# Patient Record
Sex: FEMALE | Race: BLACK OR AFRICAN AMERICAN | NOT HISPANIC OR LATINO | Employment: STUDENT | URBAN - NONMETROPOLITAN AREA
[De-identification: names, ages, dates, MRNs, and addresses within clinical notes are randomized per-mention and may not be internally consistent; named-entity substitution may affect disease eponyms.]

---

## 2024-06-19 NOTE — PROGRESS NOTES
[facility] - PODIATRY    Today's Date: 06/26/2024     Patient Name: Sharita Mishra  MRN: [id number]  [facility]: [id number]  PCP: Ekta Ash MD  Referring Provider: Ekta Ash MD    SUBJECTIVE     Chief Complaint   Patient presents with    [facility]     Ekta Ash MD 03/25/2024 Cellulitis of toe of left foot-pt states she is here today for ingrown toe nail left great toe that she has had for 5 months-pt reports pain level 8/10     HPI: Sharita Mishra, a 19 y.o.female, comes to clinic as a(n) new patient complaining of ingrown toenail.  No significant medical history . Patient reports she has had issues with an ingrown nail on her left hallux for over 5 months.  She reports she has had a round of oral antibiotics from her PCP, with no improvement.  Patient reports she has had drainage, erythema, and swelling to the lateral border of her left hallux nail. Patient is non-diabetic. Admits pain at 8/10 level, described as aching, and centered around left hallux nail . Relates previous treatment(s) including oral antibiotics . Denies any constitutional symptoms. No other pedal complaints at this time.    Past Medical History:   Diagnosis Date    Ingrown nail of great toe      History reviewed. No pertinent surgical history.  History reviewed. No pertinent family history.  Social History     Socioeconomic History    Marital status: Single   Tobacco Use    Smoking status: Never     Passive exposure: Never    Smokeless tobacco: Never   Vaping Use    Vaping status: Never Used   Substance and Sexual Activity    Alcohol use: Never    Drug use: Defer    Sexual activity: Defer     No Known Allergies  Current Outpatient Medications   Medication Sig Dispense Refill    Tri-Estarylla 0.18/0.215/0.25 MG-35 MCG per tablet Take 1 tablet by mouth Daily.       No current facility-administered medications for this visit.     Review of Systems   Constitutional:  Negative for chills and fever.   HENT:  Negative  for congestion.    Respiratory:  Negative for shortness of breath.    Cardiovascular:  Negative for chest pain and leg swelling.   Gastrointestinal:  Negative for constipation, diarrhea, nausea and vomiting.   Musculoskeletal: Negative.    Skin:  Negative for wound.   Neurological:  Negative for numbness.     OBJECTIVE     Vitals:    06/26/24 0912   BP: 110/72   Pulse: 101   SpO2: 96%       PHYSICAL EXAM  GEN:   Accompanied by friend.     Foot/Ankle Exam    GENERAL  Appearance:  appears stated age  Orientation:  AAOx3  Affect:  appropriate  Gait:  unimpaired  Assistance:  independent  Right shoe gear: sandal  Left shoe gear: sandal    VASCULAR     Right Foot Vascularity   Dorsalis pedis:  2+  Posterior tibial:  2+  Skin temperature:  warm  Edema grading:  None  CFT:  3  Pedal hair growth:  Present  Varicosities:  none     Left Foot Vascularity   Dorsalis pedis:  2+  Posterior tibial:  2+  Skin temperature:  warm  Edema grading:  None  CFT:  3  Pedal hair growth:  Present  Varicosities:  none     NEUROLOGIC     Right Foot Neurologic   Normal sensation    Light touch sensation: normal  Vibratory sensation: normal  Hot/Cold sensation: normal  Protective Sensation using [place]-Rad[place] Monofilament:   Sites intact: 10  Sites tested: 10     Left Foot Neurologic   Normal sensation    Light touch sensation: normal  Vibratory sensation: normal  Hot/Cold sensation:  normal  Protective Sensation using [place]-Radha Monofilament:   Sites intact: 10  Sites tested: 10    MUSCULOSKELETAL     Right Foot Musculoskeletal   Tenderness:  none    Arch:  Normal     Left Foot Musculoskeletal   Tenderness:  toe 1 tenderness  Arch:  Normal    MUSCLE STRENGTH     Right Foot Muscle Strength   Foot dorsiflexion:  5  Foot plantar flexion:  5  Foot inversion:  5  Foot eversion:  5     Left Foot Muscle Strength   Foot dorsiflexion:  5  Foot plantar flexion:  5  Foot inversion:  5  Foot eversion:  5    RANGE OF MOTION     Right Foot Range of  Motion   Foot and ankle ROM within normal limits       Left Foot Range of Motion   Foot and ankle ROM within normal limits      DERMATOLOGIC      Right Foot Dermatologic   Skin  Right foot skin is intact.      Left Foot Dermatologic   Skin  Left foot skin is intact.   Nails  1.  Positive for ingrown toenail and paronychia.    RADIOLOGY/NUCLEAR:  No results found.    LABORATORY/CULTURE RESULTS:      PATHOLOGY RESULTS:       ASSESSMENT/PLAN     Diagnoses and all orders for this visit:    1. Ingrown nail of great toe of left foot (Primary)  -     lidocaine (XYLOCAINE) 2% injection 5 mL    2. Paronychia of great toe of left foot      Comprehensive lower extremity examination and evaluation was performed.  Discussed findings and treatment plan including risks, benefits, and treatment options with patient in detail. Patient agreed with treatment plan.  Discussed options with patient of slant back vs partial nail avulsion.  Patient opts for PNA with matrixectomy today.   After written consent obtained, total/partial nail avulsion(s) performed as documented in procedure note. Post-procedure instructions given.    An After Visit Summary was printed and given to the patient at discharge, including (if requested) any available informative/educational handouts regarding diagnosis, treatment, or medications. All questions were answered to patient/family satisfaction. Should symptoms fail to improve or worsen they agree to call or return to clinic or to go to the Emergency Department. Discussed the importance of following up with any needed screening tests/labs/specialist appointments and any requested follow-up recommended by me today. Importance of maintaining follow-up discussed and patient accepts that missed appointments can delay diagnosis and potentially lead to worsening of conditions.  Return in about 2 weeks (around 7/10/2024) for Follow-up with Podiatry APRN, Schedule [facility]., or sooner if acute issues  arise.    Nail Removal    Date/Time: 6/26/2024 9:30 AM    Performed by: Juliette Whiting APRN  Authorized by: Juliette Whiting APRN  Location: left foot  Location details: left big toe  Anesthesia: digital block    Anesthesia:  Local Anesthetic: lidocaine 2% without epinephrine  Anesthetic total: 5 mL    Sedation:  Patient sedated: no    Preparation: skin prepped with providone-iodine  Amount removed: partial  Side: lateral  Wedge excision of skin of nail fold: no  Nail bed sutured: no  Nail matrix removed: partial  Removed nail replaced and anchored: no  Dressing: gauze roll  Patient tolerance: patient tolerated the procedure well with no immediate complications  Comments: Flushed with alcohol.  Silvadene applied. Pressure dressing applied.            This document has been electronically signed by OPAL Calixto on June 26, 2024 12:59 CDT

## 2024-06-25 ENCOUNTER — TELEPHONE (OUTPATIENT)
Age: 19
End: 2024-06-25
Payer: COMMERCIAL

## 2024-06-26 ENCOUNTER — OFFICE VISIT (OUTPATIENT)
Age: 19
End: 2024-06-26
Payer: COMMERCIAL

## 2024-06-26 VITALS
HEIGHT: 63 IN | SYSTOLIC BLOOD PRESSURE: 110 MMHG | HEART RATE: 101 BPM | WEIGHT: 150 LBS | DIASTOLIC BLOOD PRESSURE: 72 MMHG | OXYGEN SATURATION: 96 % | BODY MASS INDEX: 26.58 KG/M2

## 2024-06-26 DIAGNOSIS — L03.032 PARONYCHIA OF GREAT TOE OF LEFT FOOT: ICD-10-CM

## 2024-06-26 DIAGNOSIS — L60.0 INGROWN NAIL OF GREAT TOE OF LEFT FOOT: Primary | ICD-10-CM

## 2024-06-26 PROCEDURE — 11750 EXCISION NAIL&NAIL MATRIX: CPT | Performed by: NURSE PRACTITIONER

## 2024-06-26 PROCEDURE — 99203 OFFICE O/P NEW LOW 30 MIN: CPT | Performed by: NURSE PRACTITIONER

## 2024-06-26 RX ORDER — NORGESTIMATE AND ETHINYL ESTRADIOL 7DAYSX3 28
1 KIT ORAL DAILY
COMMUNITY
Start: 2024-04-15

## 2024-06-26 RX ORDER — LIDOCAINE HYDROCHLORIDE 20 MG/ML
5 INJECTION, SOLUTION INFILTRATION; PERINEURAL ONCE
Status: COMPLETED | OUTPATIENT
Start: 2024-06-26 | End: 2024-06-26

## 2024-06-26 RX ADMIN — LIDOCAINE HYDROCHLORIDE 5 ML: 20 INJECTION, SOLUTION INFILTRATION; PERINEURAL at 09:26

## 2024-07-09 ENCOUNTER — TELEPHONE (OUTPATIENT)
Age: 19
End: 2024-07-09
Payer: COMMERCIAL

## 2024-09-05 ENCOUNTER — TELEPHONE (OUTPATIENT)
Age: 19
End: 2024-09-05
Payer: COMMERCIAL

## 2024-09-10 NOTE — PROGRESS NOTES
[facility] - PODIATRY    Today's Date: 09/12/2024     Patient Name: Sharita Mishra  MRN: [id number]  [facility]: [id number]  PCP: Ekta Ash MD  Referring Provider: No ref. provider found    SUBJECTIVE     Chief Complaint   Patient presents with    Follow-up     Ekta Ash MD 03/25/2024 GREAT TOE LEFT FOOT F/UP- pt states right great nail seems to keep getting infected- pt pain 6/10 at worst      HPI: Sharita Mishra, a 19 y.o.female, comes to clinic as a(n) left patient complaining of ingrown toenail.  No significant medical history . Patient reports she has had issues with an ingrown nail on her left hallux for approximately 1 month.  She had a partial nail avulsion to this area approximately 2-1/2 months ago.  She reports she was unable to keep the area covered at all times with a Band-Aid, but did have relief after the procedure.  Patient reports she has had drainage, erythema, and swelling to the lateral border of her left hallux nail. Patient is non-diabetic. Admits pain at 6/10 level, described as aching, and centered around left hallux nail . Relates previous treatment(s) including oral antibiotics . Denies any constitutional symptoms. No other pedal complaints at this time.    Past Medical History:   Diagnosis Date    Ingrown nail of great toe      History reviewed. No pertinent surgical history.  History reviewed. No pertinent family history.  Social History     Socioeconomic History    Marital status: Single   Tobacco Use    Smoking status: Never     Passive exposure: Never    Smokeless tobacco: Never   Vaping Use    Vaping status: Never Used   Substance and Sexual Activity    Alcohol use: Never    Drug use: Defer    Sexual activity: Defer     No Known Allergies  Current Outpatient Medications   Medication Sig Dispense Refill    Tri-Estarylla 0.18/0.215/0.25 MG-35 MCG per tablet Take 1 tablet by mouth Daily.      doxycycline (VIBRAMYCIN) 100 MG capsule Take 1 capsule by mouth 2 (Two)  Times a Day for 10 days. 20 capsule 0     No current facility-administered medications for this visit.     Review of Systems   Constitutional:  Negative for chills and fever.   HENT:  Negative for congestion.    Respiratory:  Negative for shortness of breath.    Cardiovascular:  Negative for chest pain and leg swelling.   Gastrointestinal:  Negative for constipation, diarrhea, nausea and vomiting.   Musculoskeletal: Negative.    Skin:  Negative for wound.   Neurological:  Negative for numbness.       OBJECTIVE     Vitals:    09/12/24 1352   BP: 116/64   Pulse: 69   SpO2: 99%       PHYSICAL EXAM  GEN:   Accompanied by friend.     Foot/Ankle Exam    GENERAL  Appearance:  appears stated age  Orientation:  AAOx3  Affect:  appropriate  Gait:  unimpaired  Assistance:  independent  Right shoe gear: sandal  Left shoe gear: sandal    VASCULAR     Right Foot Vascularity   Dorsalis pedis:  2+  Posterior tibial:  2+  Skin temperature:  warm  Edema grading:  None  CFT:  3  Pedal hair growth:  Present  Varicosities:  none     Left Foot Vascularity   Dorsalis pedis:  2+  Posterior tibial:  2+  Skin temperature:  warm  Edema grading:  None  CFT:  3  Pedal hair growth:  Present  Varicosities:  none     NEUROLOGIC     Right Foot Neurologic   Normal sensation    Light touch sensation: normal  Vibratory sensation: normal  Hot/Cold sensation: normal  Protective Sensation using [place]-Radha Monofilament:   Sites intact: 10  Sites tested: 10     Left Foot Neurologic   Normal sensation    Light touch sensation: normal  Vibratory sensation: normal  Hot/Cold sensation:  normal  Protective Sensation using [place]-Radha Monofilament:   Sites intact: 10  Sites tested: 10    MUSCULOSKELETAL     Right Foot Musculoskeletal   Tenderness:  none    Arch:  Normal     Left Foot Musculoskeletal   Tenderness:  toe 1 tenderness  Arch:  Normal    MUSCLE STRENGTH     Right Foot Muscle Strength   Foot dorsiflexion:  5  Foot plantar flexion:  5  Foot  inversion:  5  Foot eversion:  5     Left Foot Muscle Strength   Foot dorsiflexion:  5  Foot plantar flexion:  5  Foot inversion:  5  Foot eversion:  5    RANGE OF MOTION     Right Foot Range of Motion   Foot and ankle ROM within normal limits       Left Foot Range of Motion   Foot and ankle ROM within normal limits      DERMATOLOGIC      Right Foot Dermatologic   Skin  Right foot skin is intact.      Left Foot Dermatologic   Skin  Left foot skin is intact.   Nails  1.  Positive for ingrown toenail and paronychia. (Purulent drainage when pressed. )      RADIOLOGY/NUCLEAR:  No results found.    LABORATORY/CULTURE RESULTS:      PATHOLOGY RESULTS:       ASSESSMENT/PLAN     Diagnoses and all orders for this visit:    1. Ingrown nail of great toe of left foot (Primary)  -     Nail Removal  -     lidocaine (XYLOCAINE) 2% injection 5 mL  -     doxycycline (VIBRAMYCIN) 100 MG capsule; Take 1 capsule by mouth 2 (Two) Times a Day for 10 days.  Dispense: 20 capsule; Refill: 0    2. Paronychia of great toe of left foot  -     Nail Removal  -     lidocaine (XYLOCAINE) 2% injection 5 mL  -     doxycycline (VIBRAMYCIN) 100 MG capsule; Take 1 capsule by mouth 2 (Two) Times a Day for 10 days.  Dispense: 20 capsule; Refill: 0        Comprehensive lower extremity examination and evaluation was performed.  Discussed findings and treatment plan including risks, benefits, and treatment options with patient in detail. Patient agreed with treatment plan.  Discussed options with patient of a PNA or trying to remove excess skin around nail bed.  Patient opts for PNA with matrixectomy today.   After written consent obtained, total/partial nail avulsion(s) performed as documented in procedure note. Post-procedure instructions given.    Patient will begin oral antibiotics due to purulent drainage from the nailbed.  If she begins to have any side effects she will contact the office.  Patient verbalized understanding.  An After Visit Summary was  printed and given to the patient at discharge, including (if requested) any available informative/educational handouts regarding diagnosis, treatment, or medications. All questions were answered to patient/family satisfaction. Should symptoms fail to improve or worsen they agree to call or return to clinic or to go to the Emergency Department. Discussed the importance of following up with any needed screening tests/labs/specialist appointments and any requested follow-up recommended by me today. Importance of maintaining follow-up discussed and patient accepts that missed appointments can delay diagnosis and potentially lead to worsening of conditions.  Return in about 2 weeks (around 9/26/2024) for Recheck, With Juliette JOSEPH., or sooner if acute issues arise.    Nail Removal    Date/Time: 9/12/2024 2:30 PM    Performed by: Juliette Whiting APRN  Authorized by: Juliette Whiting APRN  Location: left foot  Location details: left big toe  Anesthesia: digital block    Anesthesia:  Local Anesthetic: lidocaine 2% without epinephrine  Anesthetic total: 5 mL    Sedation:  Patient sedated: no    Preparation: skin prepped with providone-iodine  Amount removed: partial  Side: lateral  Wedge excision of skin of nail fold: no  Nail bed sutured: no  Nail matrix removed: partial  Removed nail replaced and anchored: no  Patient tolerance: patient tolerated the procedure well with no immediate complications  Comments: Flushed with alcohol.  Silvadene applied.  Pressure dressing applied.             This document has been electronically signed by OPAL Calixto on September 12, 2024 17:00 CDT

## 2024-09-11 ENCOUNTER — TELEPHONE (OUTPATIENT)
Age: 19
End: 2024-09-11
Payer: COMMERCIAL

## 2024-09-12 ENCOUNTER — OFFICE VISIT (OUTPATIENT)
Age: 19
End: 2024-09-12
Payer: COMMERCIAL

## 2024-09-12 VITALS
SYSTOLIC BLOOD PRESSURE: 116 MMHG | DIASTOLIC BLOOD PRESSURE: 64 MMHG | HEART RATE: 69 BPM | BODY MASS INDEX: 26.22 KG/M2 | HEIGHT: 63 IN | WEIGHT: 148 LBS | OXYGEN SATURATION: 99 %

## 2024-09-12 DIAGNOSIS — L03.032 PARONYCHIA OF GREAT TOE OF LEFT FOOT: ICD-10-CM

## 2024-09-12 DIAGNOSIS — L60.0 INGROWN NAIL OF GREAT TOE OF LEFT FOOT: Primary | ICD-10-CM

## 2024-09-12 RX ORDER — LIDOCAINE HYDROCHLORIDE 20 MG/ML
5 INJECTION, SOLUTION INFILTRATION; PERINEURAL ONCE
Status: COMPLETED | OUTPATIENT
Start: 2024-09-12 | End: 2024-09-12

## 2024-09-12 RX ORDER — DOXYCYCLINE 100 MG/1
100 CAPSULE ORAL 2 TIMES DAILY
Qty: 20 CAPSULE | Refills: 0 | Status: SHIPPED | OUTPATIENT
Start: 2024-09-12 | End: 2024-09-22

## 2024-09-12 RX ADMIN — LIDOCAINE HYDROCHLORIDE 5 ML: 20 INJECTION, SOLUTION INFILTRATION; PERINEURAL at 14:29

## 2024-09-12 NOTE — LETTER
September 12, 2024     Patient: Sharita Mishra   YOB: 2005   Date of Visit: 9/12/2024       To Whom It May Concern:    It is my medical opinion that Sharita Mishra may return to work on 9/15/2024 .  If you have any questions, please contact our office.           Sincerely,        OPAL Calixto